# Patient Record
(demographics unavailable — no encounter records)

---

## 2024-10-10 NOTE — ASSESSMENT
[FreeTextEntry1] : 30-year-old female with no past medical history here to establish care and CPE. Concerns include chronic low back pain with radiation to left lower extremity and unwanted weight gain for 2 years. Regarding back pain, pain is likely musculoskeletal due to SI joint etiology vs sciatica with no red flag symptoms (trauma, unexplained weight loss, hx cancer, fever, IV drug use, neurological deficits, saddle anesthesia). Doubt cauda equina syndrome, fracture, or spinal infection.

## 2024-10-10 NOTE — HEALTH RISK ASSESSMENT
[No] : In the past 12 months have you used drugs other than those required for medical reasons? No [0] : 2) Feeling down, depressed, or hopeless: Not at all (0) [Yes] : Yes [Monthly or less (1 pt)] : Monthly or less (1 point) [1 or 2 (0 pts)] : 1 or 2 (0 points) [Never (0 pts)] : Never (0 points) [Patient reported PAP Smear was normal] : Patient reported PAP Smear was normal [With Family] : lives with family [Unemployed] : unemployed [Single] : single [Sexually Active] : sexually active [HIV Test offered] : HIV Test offered [Hepatitis C test offered] : Hepatitis C test offered [Never] : Never [Audit-CScore] : 1 [de-identified] : As stated in HPI [de-identified] : As stated in HPI  [BLE7Ghenr] : 0 [High Risk Behavior] : no high risk behavior [PapSmearDate] : 2021 [PapSmearComments] : No prior history of abnormal pap smears  [FreeTextEntry3] : 2, 5, 6 boys  [de-identified] : Male, 1 partner (Had vasectomy)

## 2024-10-10 NOTE — PAST MEDICAL HISTORY
[Menarche Age ____] : age at menarche was [unfilled] [Definite ___ (Date)] : the last menstrual period was [unfilled] [Regular Cycle Intervals] : have been regular [Total Preg ___] : G[unfilled] [Live Births ___] : P[unfilled]  [Living ___] : Living: [unfilled]

## 2024-10-10 NOTE — HISTORY OF PRESENT ILLNESS
[de-identified] : 29 yo female with no past medical history here to establish care and for CPE.   1. Chronic lower back pain worsening for the past 3 months. Onset initially after giving birth to 2 year old son. Initially episodes were sporadic but now more constant. This episode has been ongoing for 8 days. Pain is located on left lower back, described as sharp/dull/achy, and radiates to left lower extremity to ankle. Pain is worse with physical activity (working out, treadmill), improves with warm compress. No other treatments including physical therapy tried. Denies trauma/injury, history of malignancy, IV drug use, current or past use of corticosteroid medications, or spinal surgery. No fevers, chills, unintentional weight loss, night sweats, neurologic symptoms (weakness, fall or gait instability, numbness or other sensory changes, or bowel/bladder symptoms.  2. Weight gain, unwanted.  Prior pregnancy, she weighed 150 Ibs and maintained weight with subsequent pregnancy. After last pregnancy (2 years ago), has gained weight to 182 Ibs. Lowest weight 140-145 Ib, which is goal weight.  Physical activity: Walks daily, [id number] steps. Home workouts: [employer] 2x/week (45 minutes)  Diet: Fast consumption: Rarely, Only drinks seltzer water, mainly vegetables with rice/chicken/beef, intermittent fasting (first meal- 11:30, last food consumption 6:30pm)

## 2024-10-10 NOTE — REVIEW OF SYSTEMS
[Recent Change In Weight] : ~T recent weight change [Shortness Of Breath] : shortness of breath [Constipation] : constipation [Back Pain] : back pain [Negative] : Neurological [FreeTextEntry2] : Weight gain  [FreeTextEntry6] : With activity  [FreeTextEntry7] : bowel movements 2x/week

## 2024-10-10 NOTE — PLAN
[FreeTextEntry1] : #CPE  - AV labs ordered  - STD screen ordered - Gyn referral placed for well woman exam including pap smear   #Unwanted weight gain  #Obesity, BMI 34  - AV labs as above  - Recommend completing a diary of food intake (breakfast, lunch, dinner, snacks), exercise, and sleep  - Information on healthy lifestyle including exercise, diet rich in fiber and low in processed foods, adequate sleep >7 hours per night given  - RTC for weight loss counseling   #Chronic back pain  - Patient does not want any oral medications  - Recommend trial of OTC voltaren gel  - Physical therapy referral placed  - If no improvement, will consider further workup including imaging   #Constipation  - Discussed lifestyle modification including high fiber diet  - Can take magnesium supplement and OTC fiber supplements such as Metamucil

## 2024-10-10 NOTE — PHYSICAL EXAM
[No Acute Distress] : no acute distress [Well Nourished] : well nourished [Well Developed] : well developed [Normal Sclera/Conjunctiva] : normal sclera/conjunctiva [EOMI] : extraocular movements intact [Normal Outer Ear/Nose] : the outer ears and nose were normal in appearance [Normal Oropharynx] : the oropharynx was normal [Normal TMs] : both tympanic membranes were normal [Supple] : supple [No Respiratory Distress] : no respiratory distress  [Clear to Auscultation] : lungs were clear to auscultation bilaterally [Normal Rate] : normal rate  [Regular Rhythm] : with a regular rhythm [Soft] : abdomen soft [Non Tender] : non-tender [Non-distended] : non-distended [No Joint Swelling] : no joint swelling [Grossly Normal Strength/Tone] : grossly normal strength/tone [No Rash] : no rash [Coordination Grossly Intact] : coordination grossly intact [No Focal Deficits] : no focal deficits [Normal Gait] : normal gait [Normal Affect] : the affect was normal [Normal Insight/Judgement] : insight and judgment were intact [de-identified] : Referral for gyn exam placed

## 2024-10-16 NOTE — HISTORY OF PRESENT ILLNESS
[de-identified] : 31 yo female here for follow up on labs and weight gain.   Lowest Weight: 140-145 Ibs Highest Weight: 182 Ibs  Goal Weight: 140-145 Ibs  Weight history and reasons for weight gain? Weight gain after each pregnancy, however was able to lose weight with the first two. Hasnt been able to lose weight for  Weight loss programs tried in past? After firstborn, gained weight. Tried Keto for 1 year and lost 50 pounds. Also walked a lot. Went from 179 Ibs and ended up at 145 Ibs. Reports feeling well. This time, tried keto but unable to lose weight, feels more hungry/craving    Nutrition: Fruits: 3-4/week Vegetables: 3-4/week  Fast food: Rarely  Sugary drinks (soda, sports drinks, juice): Only unsweetened seltzers  Packaged foods (chips, candy, crackers, cookies): Milk Chocolate bar 1x/week    24 hour daily recall Breakfast: Eggs (boiled)  Lunch: Oatmeal with vera, almond milk, strawberries  Dinner: Keto lasagna (with zucchini, ground beef)  Snacks: None  Beverages: [place]  Water intake:  1 Glass of water   Exercise: [employer] bike 2x/ week for 45 minutes each. Has not done it for 2 weeks due to back pain, which has improved   Sleep:  Number hours per weekday: 7 hours (Sleep hours: 11pm -6 am) Number hours per weekends: 7 hours (Sleep hours: 11pm-6am) Denies snoring, apnea episodes, trouble sleeping, daytime fatigue.    Substances:  Tobacco: Denies  ETOH: [place] or less, 1-2 drinks at a time Illicit Drugs: Denies

## 2024-10-16 NOTE — ASSESSMENT
[FreeTextEntry1] : 29 yo female with here for weight loss counseling and follow up of labs. Labs notable for vitamin d deficiency, insulin resistance (Elevated fasting insulin, impaired fasting glucose, DISHA IR >2, BMI >30), and hyperlipidemia.

## 2024-10-16 NOTE — COUNSELING
[Potential consequences of obesity discussed] : Potential consequences of obesity discussed [Benefits of weight loss discussed] : Benefits of weight loss discussed [Encouraged to maintain food diary] : Encouraged to maintain food diary [Encouraged to increase physical activity] : Encouraged to increase physical activity [Encouraged to use exercise tracking device] : Encouraged to use exercise tracking device [Target Wt Loss Goal ___] : Weight Loss Goals: Target weight loss goal [unfilled] lbs [Good understanding] : Patient has a good understanding of disease, goals and obesity follow-up plan [FreeTextEntry2] : low consumption of fiber, vegetables, and fruits  The following goals were created:  1. Increase water intake to 4-5 cups/day  2. Removes oats, refined grains including bread, pasta  3. Add strength training to 3x/week  [FreeTextEntry4] : 20

## 2024-10-16 NOTE — PLAN
[FreeTextEntry1] : #Insulin resistance  #HLD  #Obesity, BMI >30  - Discussed the role of insulin resistance and increase risk for developing type 2 diabetes and cardiovascular diseases  - Discussed the importance of lifestyle changes  - Lifestyle counseling provided including balanced diet with lean proteins, vegetables, fruits, and healthy fat and removing processed foods including refined grains.   - Counseled on at least 150 minutes per week of moderate intensity aerobic activity (walking, biking, jogging) or 75 minutes per week of vigorous aerobic activity (running, playing soccer or basketball) as well as at least 2 days per week of moderate to high intensity muscle strengthening activity  - Goal created as highlighted above - Follow up in 1 month on progress. Will repeat labs in 3-4 months   #Constipation  - Dietary counseling as above  - Discussed foods high in Fiber and options of OTC fiber supplementation   #Vitamin D deficiency  - Recommend starting vitamin supplement [id number] IU daily. Recheck in 3-4 months with above labs

## 2024-10-16 NOTE — PHYSICAL EXAM
[No Acute Distress] : no acute distress [Well Nourished] : well nourished [Well Developed] : well developed [Normal Sclera/Conjunctiva] : normal sclera/conjunctiva [Supple] : supple [No Respiratory Distress] : no respiratory distress  [Normal Rate] : normal rate  [Soft] : abdomen soft [Non-distended] : non-distended [No Rash] : no rash [Coordination Grossly Intact] : coordination grossly intact [No Focal Deficits] : no focal deficits [Normal Gait] : normal gait [Normal Affect] : the affect was normal [Normal Insight/Judgement] : insight and judgment were intact

## 2024-10-16 NOTE — REVIEW OF SYSTEMS
[Recent Change In Weight] : ~T recent weight change [Constipation] : constipation [Negative] : Psychiatric

## 2024-10-16 NOTE — DATA REVIEWED
[FreeTextEntry1] : 1. Vitamin D 17.9 low  2. Fasting Insulin 20.5 High, Fasting blood glucose high 102, A1C 5.2 normal. DISHA IR  3. Lipid profile: Trig 66, Chol 167, HDL 49 (L),  (H). Trig/HDL= 1.34

## 2024-11-13 NOTE — ASSESSMENT
[FreeTextEntry1] : 30 year old female with insulin resistance, vitamin D deficiency, and obesity (BMI >30) here for weight loss follow up. Since initial visit, patient has lost ~7 Ibs and since last visit patient has lost ~1 Ib. Today's weight 174.8 Ibs and Waist circumference 36 cm, BMI 33.

## 2024-11-13 NOTE — COUNSELING
[Potential consequences of obesity discussed] : Potential consequences of obesity discussed [Benefits of weight loss discussed] : Benefits of weight loss discussed [Structured Weight Management Program suggested:] : Structured weight management program suggested [Encouraged to maintain food diary] : Encouraged to maintain food diary [Encouraged to increase physical activity] : Encouraged to increase physical activity [Encouraged to use exercise tracking device] : Encouraged to use exercise tracking device [Target Wt Loss Goal ___] : Weight Loss Goals: Target weight loss goal [unfilled] lbs [Good understanding] : Patient has a good understanding of disease, goals and obesity follow-up plan [FreeTextEntry2] : Goal:  1. Eat earlier than 10 am 2. Continue strength training at [employer] 3x/week, >30 minute of walking/[employer] bike 2x/week   [FreeTextEntry4] : 20

## 2024-11-13 NOTE — PLAN
[FreeTextEntry1] : - Plan is to continue lifestyle modification including physical activity at least 5x/week - Continue dietary changes with minimal processed foods - Return in 1 month for follow up

## 2024-11-13 NOTE — COUNSELING
[Potential consequences of obesity discussed] : Potential consequences of obesity discussed [Benefits of weight loss discussed] : Benefits of weight loss discussed [Structured Weight Management Program suggested:] : Structured weight management program suggested [Encouraged to maintain food diary] : Encouraged to maintain food diary [Encouraged to increase physical activity] : Encouraged to increase physical activity [Encouraged to use exercise tracking device] : Encouraged to use exercise tracking device [Target Wt Loss Goal ___] : Weight Loss Goals: Target weight loss goal [unfilled] lbs [Good understanding] : Patient has a good understanding of disease, goals and obesity follow-up plan [FreeTextEntry2] : Goal:  1. Eat earlier than 10 am 2. Continue strength training at [employer] 3x/week, >30 minute of walking/[place] bike 2x/week   [FreeTextEntry4] : 20

## 2024-11-13 NOTE — PHYSICAL EXAM
[No Acute Distress] : no acute distress [Well Nourished] : well nourished [Well Developed] : well developed [Normal Sclera/Conjunctiva] : normal sclera/conjunctiva [Normal Outer Ear/Nose] : the outer ears and nose were normal in appearance [Supple] : supple [No Respiratory Distress] : no respiratory distress  [Normal Rate] : normal rate  [Soft] : abdomen soft [Non-distended] : non-distended [No Joint Swelling] : no joint swelling [Grossly Normal Strength/Tone] : grossly normal strength/tone [No Rash] : no rash [Coordination Grossly Intact] : coordination grossly intact [No Focal Deficits] : no focal deficits [Normal Gait] : normal gait [Normal Affect] : the affect was normal [Normal Insight/Judgement] : insight and judgment were intact

## 2024-11-13 NOTE — HISTORY OF PRESENT ILLNESS
Outreach call to patient to support a smooth transition of care from recent admission completed. Will follow through the transition period.    
[de-identified] : 30 year old female here for weight loss follow up.   Last Weight: 176 Ibs 10/16/2024 Goal Weight: 140-145 Ibs The following goals were created: 1. Increase water intake to 4-5 cups/day 2. Removes oats, refined grains including bread, pasta 3. Add strength training to 3x/week    Since last visit: She is attending classes for 45 minutes, 3x/week at [employer]. Classes incorporate both strength training and cardio. She also walks ~3 miles 2x/week. Diet: Minimal grains, mainly vegetables with protein (chicken, steak). She eats from 1030am to 7pm.
[de-identified] : 30 year old female here for weight loss follow up.   Last Weight: 176 Ibs 10/16/2024 Goal Weight: 140-145 Ibs The following goals were created: 1. Increase water intake to 4-5 cups/day 2. Removes oats, refined grains including bread, pasta 3. Add strength training to 3x/week    Since last visit: She is attending classes for 45 minutes, 3x/week at [employer]. Classes incorporate both strength training and cardio. She also walks ~3 miles 2x/week. Diet: Minimal grains, mainly vegetables with protein (chicken, steak). She eats from 1030am to 7pm.

## 2024-12-18 NOTE — PHYSICAL EXAM
[No Acute Distress] : no acute distress [Well Nourished] : well nourished [Well Developed] : well developed [Normal Sclera/Conjunctiva] : normal sclera/conjunctiva [EOMI] : extraocular movements intact [Normal Outer Ear/Nose] : the outer ears and nose were normal in appearance [Normal Oropharynx] : the oropharynx was normal [Supple] : supple [No Respiratory Distress] : no respiratory distress  [Normal Rate] : normal rate  [Soft] : abdomen soft [Non-distended] : non-distended [No Joint Swelling] : no joint swelling [Grossly Normal Strength/Tone] : grossly normal strength/tone [No Rash] : no rash [Coordination Grossly Intact] : coordination grossly intact [No Focal Deficits] : no focal deficits [Normal Gait] : normal gait [Normal Affect] : the affect was normal [Normal Insight/Judgement] : insight and judgment were intact

## 2024-12-20 NOTE — COUNSELING
[Potential consequences of obesity discussed] : Potential consequences of obesity discussed [Benefits of weight loss discussed] : Benefits of weight loss discussed [Encouraged to maintain food diary] : Encouraged to maintain food diary [Target Wt Loss Goal ___] : Weight Loss Goals: Target weight loss goal [unfilled] lbs [FreeTextEntry2] : Recommend documenting dietary intake for 1-2 weeks for next appointment  [FreeTextEntry4] : 15

## 2024-12-20 NOTE — HISTORY OF PRESENT ILLNESS
[de-identified] : 30 year old female here for weight loss follow up.  Initial Weight: 182 Ib 10/10/2024 Last Weight: 174 Ibs 11/13/2024, Waist circumference 36 cm, BMI 33. Goal Weight: 140-145 Ibs The following goals were created: 1. Increase water intake to 4-5 cups/day 2. Removes oats, refined grains including bread, pasta 3. Add strength training to 3x/week  11/13/2024: She is attending classes for 45 minutes, 3x/week at [employer]. Classes incorporate both strength training and cardio. She also walks ~3 miles 2x/week. Diet: Minimal grains, mainly vegetables with protein (chicken, steak). She eats from 1030am to 7pm. Following goals created 1. Eat earlier than 10 am 2. Continue strength training at [employer] 3x/week, >30 minute of walking/[employer] bike 2x/week    Today,  Currently training at [employer]: 5 days/week for 45 minutes. Not walking due to weather  Breakfast: protein shake  Lunch: Salad or fruit  Dinner: Chicken or steak, vegetable (Bok damian, broccoli, asparagus)  Reports feeling bloated, poor appetite, feels full in the morning with poor appetite

## 2024-12-20 NOTE — ADDENDUM
[FreeTextEntry1] : Labs with persistent Insulin resistance despite lifestyle modifications. Improvement in LDL with low HDL  Reviewed treatment options including metformin and GLP-1 agonists. At this time, patient is interested in GLP-1 Agonist for improvement in insulin resistance and weight loss.   The patient was educated on the importance of medication adherence. Discussed Tirzepatide (Zepbound) 2.5 mg weekly x 4, including dosing, expected benefits, and potential side effects. The patient voiced understanding and agreement to the plan.  Follow up in 4 weeks.   All questions answered.

## 2024-12-20 NOTE — PLAN
[FreeTextEntry1] : - Repeat Insulin, FBG, and lipid panel  - Discussed different treatment options including:  Metformin to help with insulin sensitivity. Discussed dosing, benefit, side effects  GLP-1 agonists: Discussed dosing, benefit, side effects  Patient is interested in metformin therapy. Will evaluate pending above labs  - Recommend keeping a dietary log for 1-2 weeks for closer evaluation of daily dietary and calorie intake - Follow up in 1 month  - Recommend fiber rich diet and probiotics for abdominal bloating

## 2024-12-20 NOTE — ASSESSMENT
[FreeTextEntry1] : 30 year old female with insulin resistance, vitamin D deficiency, and obesity (BMI >30) here for weight loss follow up. Prior appointment, patient had lost total of ~8 Ibs, however has gained ~5 Ibs. Today's weight 179 Ibs, BMI 33. She is overall consuming a healthy diet low in processed foods and physically active for 5 days/week. Suspect difficulty with weight loss is due to metabolic factors including insulin resistance as it promotes fat storage and prevents fat breakdown. Doubt thyroid etiology (normal TSH) or PCOS (regular periods, no hirsutism). Other differentials include muscle gain from strength training

## 2024-12-20 NOTE — HISTORY OF PRESENT ILLNESS
[de-identified] : 30 year old female here for weight loss follow up.  Initial Weight: 182 Ib 10/10/2024 Last Weight: 174 Ibs 11/13/2024, Waist circumference 36 cm, BMI 33. Goal Weight: 140-145 Ibs The following goals were created: 1. Increase water intake to 4-5 cups/day 2. Removes oats, refined grains including bread, pasta 3. Add strength training to 3x/week  11/13/2024: She is attending classes for 45 minutes, 3x/week at [employer]. Classes incorporate both strength training and cardio. She also walks ~3 miles 2x/week. Diet: Minimal grains, mainly vegetables with protein (chicken, steak). She eats from 1030am to 7pm. Following goals created 1. Eat earlier than 10 am 2. Continue strength training at [employer] 3x/week, >30 minute of walking/[employer] bike 2x/week    Today,  Currently training at [employer]: 5 days/week for 45 minutes. Not walking due to weather  Breakfast: protein shake  Lunch: Salad or fruit  Dinner: Chicken or steak, vegetable (Bok damian, broccoli, asparagus)  Reports feeling bloated, poor appetite, feels full in the morning with poor appetite

## 2024-12-20 NOTE — HISTORY OF PRESENT ILLNESS
[de-identified] : 30 year old female here for weight loss follow up.  Initial Weight: 182 Ib 10/10/2024 Last Weight: 174 Ibs 11/13/2024, Waist circumference 36 cm, BMI 33. Goal Weight: 140-145 Ibs The following goals were created: 1. Increase water intake to 4-5 cups/day 2. Removes oats, refined grains including bread, pasta 3. Add strength training to 3x/week  11/13/2024: She is attending classes for 45 minutes, 3x/week at [email or web address]. Classes incorporate both strength training and cardio. She also walks ~3 miles 2x/week. Diet: Minimal grains, mainly vegetables with protein (chicken, steak). She eats from 1030am to 7pm. Following goals created 1. Eat earlier than 10 am 2. Continue strength training at [employer] 3x/week, >30 minute of walking/[employer] bike 2x/week    Today,  Currently training at [employer]: 5 days/week for 45 minutes. Not walking due to weather  Breakfast: protein shake  Lunch: Salad or fruit  Dinner: Chicken or steak, vegetable (Bok damian, broccoli, asparagus)  Reports feeling bloated, poor appetite, feels full in the morning with poor appetite

## 2025-06-18 NOTE — REVIEW OF SYSTEMS
[Recent Change In Weight] : ~T recent weight change [de-identified] : Grief/stress associated with recent loss

## 2025-06-18 NOTE — ASSESSMENT
[FreeTextEntry1] : 31 year old female with insulin resistance, vitamin D deficiency, and obesity (BMI >30) here for weight loss follow up. She has not been seen since 12/2024 and had initial success with weight loss up to 15 Ibs with lifestyle modifications and metformin therapy but regained weight due to dietary change after loss of her grandfather. I spent 15 minutes counseling patient on weight loss through lifestyle modifications, including nutrition, physical activity, and behavior change.  Discussed strategies to reduce "food noise" such as mindful eating, reducing exposure to processed foods, and limiting environmental triggers. Emphasized the importance of stress management and appetite regulation and overall health.  Patient expressed understanding and is motivated to implement these changes.  Follow-up plan in 1 week to reviewed 7-day lifestyle log including diet, sleep, stress level, exercise, etc. and ongoing counseling and support. Metformin refilled and patient to resume 500 mg daily, will recheck labs in 3 months.   Visit conducted as part of ongoing, longitudinal medical care for patient's medical and other issues.

## 2025-06-18 NOTE — HISTORY OF PRESENT ILLNESS
[de-identified] : 31 year old female with history of HLD, insulin resistance, obesity here for follow up. She reports she had success with weight loss with lifestyle modification and metformin therapy in January and able to lower weight to 169 Ibs (~15 Ib weight loss). However, after her grandfather passed away in February she regained the weight mainly through increase food consumption. She reports she has high food noise and frequently craves unhealthy snacks mainly at nighttime. She continues to exercise regularly. She is interested in resuming her weight loss journey. Goal Weight: 140-145 Ibs